# Patient Record
Sex: MALE | Race: WHITE | NOT HISPANIC OR LATINO | Employment: FULL TIME | URBAN - METROPOLITAN AREA
[De-identification: names, ages, dates, MRNs, and addresses within clinical notes are randomized per-mention and may not be internally consistent; named-entity substitution may affect disease eponyms.]

---

## 2024-02-26 ENCOUNTER — LAB (OUTPATIENT)
Dept: LAB | Facility: LAB | Age: 54
End: 2024-02-26
Payer: COMMERCIAL

## 2024-02-26 ENCOUNTER — OFFICE VISIT (OUTPATIENT)
Dept: NEUROLOGY | Facility: CLINIC | Age: 54
End: 2024-02-26
Payer: COMMERCIAL

## 2024-02-26 VITALS
RESPIRATION RATE: 16 BRPM | BODY MASS INDEX: 33.32 KG/M2 | HEIGHT: 65 IN | SYSTOLIC BLOOD PRESSURE: 115 MMHG | HEART RATE: 104 BPM | WEIGHT: 200 LBS | DIASTOLIC BLOOD PRESSURE: 83 MMHG

## 2024-02-26 DIAGNOSIS — G61.81 CIDP (CHRONIC INFLAMMATORY DEMYELINATING POLYNEUROPATHY) (MULTI): ICD-10-CM

## 2024-02-26 DIAGNOSIS — G61.81 CIDP (CHRONIC INFLAMMATORY DEMYELINATING POLYNEUROPATHY) (MULTI): Primary | ICD-10-CM

## 2024-02-26 PROCEDURE — 36415 COLL VENOUS BLD VENIPUNCTURE: CPT

## 2024-02-26 PROCEDURE — 1036F TOBACCO NON-USER: CPT | Performed by: STUDENT IN AN ORGANIZED HEALTH CARE EDUCATION/TRAINING PROGRAM

## 2024-02-26 PROCEDURE — 99215 OFFICE O/P EST HI 40 MIN: CPT | Mod: GC | Performed by: STUDENT IN AN ORGANIZED HEALTH CARE EDUCATION/TRAINING PROGRAM

## 2024-02-26 PROCEDURE — 99215 OFFICE O/P EST HI 40 MIN: CPT | Performed by: STUDENT IN AN ORGANIZED HEALTH CARE EDUCATION/TRAINING PROGRAM

## 2024-02-26 RX ORDER — CARBAMAZEPINE 100 MG/1
100 TABLET, CHEWABLE ORAL 2 TIMES DAILY
COMMUNITY

## 2024-02-26 RX ORDER — PREDNISONE 20 MG/1
60 TABLET ORAL DAILY
Qty: 180 TABLET | Refills: 2 | Status: SHIPPED | OUTPATIENT
Start: 2024-02-26 | End: 2024-08-24

## 2024-02-26 RX ORDER — HYDROCODONE BITARTRATE AND ACETAMINOPHEN 5; 325 MG/1; MG/1
1 TABLET ORAL EVERY 6 HOURS PRN
COMMUNITY
Start: 2024-02-19 | End: 2024-02-26

## 2024-02-26 RX ORDER — ERGOCALCIFEROL 1.25 MG/1
50000 CAPSULE ORAL
COMMUNITY
Start: 2024-02-15 | End: 2024-05-10

## 2024-02-26 ASSESSMENT — PAIN SCALES - GENERAL: PAINLEVEL: 1

## 2024-02-26 NOTE — PROGRESS NOTES
Date of Service: 2/26/2024  Patient: Dar Gómez  MRN: [id number]  Primary Care Physician: Enrico Oliva MD     History of Present Illness:    Mr. Gómez is a 53 y.o. 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 He come with his wife, and he is from [place].     To recap:   Around 2019, he started having bilateral upper and lower extremity tingling and numbness. Electrodiagnostic testing performed in the office on 08/2022 failed to reveal any obtainable motor or sensory responses (similar to what is reported in outside studies). These results would typically suggest an end-stage axonal peripheral polyneuropathy, however, that does not match his clinical presentation. For example, he has no notable atrophy or limitations in strength. 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 He was then started on IVIg.    Interval History:   - He was started on IVIg, and achieved good response, and came back 75% to his normal self. His best month was 03/2023.  - But around Thanksgiving of 2023, he started slow decline, mainly in his gait and distal hand function.  -- It became difficult for him to walk or open bottles.  - Then he was changed to SQIG, with continuous decline in his gait.  - Throughout this, he continues to have neuropathic pain, which is managed by Tegretol.   - He broke his right foot on 01/2024, and did not have much pain due to dense numbness in the feet.  - Currently: he has numbness and pain in the feet and up to the knees, and in the hands, along with weakness in the left foot and hands.    Allergies   Allergen Reactions    Iodine Swelling     Liquid with an ear surgery-topical      Medications:    Current Outpatient Medications:      "ergocalciferol (Vitamin D-2) 1.25 MG (27059 UT) capsule, Take 1 capsule (50,000 Units) by mouth every 7 days., Disp: , Rfl:     carBAMazepine (TEGretol) 100 mg chewable tablet, Chew 1 tablet (100 mg) 2 times a day., Disp: , Rfl:     HYDROcodone-acetaminophen (Norco) 5-325 mg tablet, Take 1 tablet by mouth every 6 hours if needed., Disp: , Rfl:     immune globulin, human, (Hizentra) subcutaneous infusion, Inject under the skin every 7 days., Disp: , Rfl:     predniSONE (Deltasone) 20 mg tablet, Take 3 tablets (60 mg) by mouth once daily., Disp: 180 tablet, Rfl: 2     Physical Exam:     /83   Pulse 104   Resp 16   Ht 1.651 m (5' 5\")   Wt 90.7 kg (200 lb)   BMI 33.28 kg/m²     Brief Neurological Exam:  MENTAL STATE:   Orientation was normal to time, place and person.      CRANIAL NERVES:   CN 3, 4, 6   Lids symmetric; no ptosis. EOMs normal alignment, full range. No nystagmus.   CN 5   Facial sensation intact bilaterally.   CN 7   Normal and symmetric facial strength. Nasolabial folds symmetric.   CN 8   Hearing intact to conversation  CN 9   Palate elevates symmetrically.   CN 11   Normal strength of shoulder shrug and neck turning.   CN 12   Tongue midline, with normal bulk and strength; no fasciculations.     Motor:  Muscle bulk: Normal throughout.  Muscle tone: Normal in both upper and lower extremities.  Movements: No fasciculations. Slight intention tremor    MMT reveals the following MRC grades:     R             L   5             5             Shoulder abduction    5             5             Shoulder adduction               5             5             Elbow flexion           5             5             Elbow extension      5             5             Wrist flexion             4+             4+             Wrist extension        4+             4+             Finger flexion          4-             4-             Finger extension      2             3             Finger abduction     4             4     "         Finger adduction    4             5             Thumb distal flexion                3             3             Thumb abduction       5             5             Hip flexion               5             5             Hip adduction           5             5             Hip abduction           5             5             Knee flexion            5             5             Knee extension        *             4             Ankle dorsiflexion    *             5             Ankle plantarflexion                *             5             Eversion   *             5             Inversion   *             3             Big toe extension     *             4             Toe flexion     * brace    REFLEXES:   RIGHT UE LEFT UE   BR:0   BR:0   Biceps:0 Biceps:0   Triceps:0 Triceps:0     RIGHT LLE LEFT LLE   Knee:0            Knee:0   Ankle:* Ankle:0   * brace     No clonus or other pathological reflexes.      Sensation:  Light touch & Pinprick: length dependent decrease sensation in the foot to the knees with the foot having dense numbness  Vibration: impaired in both big toes and malleoli, intact in knees  Proprioception: impaired impaired in both big toes, intact in ankle    Coordination: Slight ataxia on finger-nose-finger bilaterally     Gait: Right foot fracture with limited walking ability    Results:     The following labs, imaging, and results were personally reviewed and demonstrated:    Labs:    Lab Results   Component Value Date    SPEP NORMAL 08/04/2022     Impression/Plan:   Mr. Gómez is a 53 y.o. 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 Electrodiagnostic testing performed on 08/2022 failed to reveal any obtainable motor or sensory responses (similar to what is reported in outside studies). These results would typically suggest an end-stage axonal peripheral polyneuropathy, however, that does not match his clinical presentation. For example, he  has no notable atrophy or limitations in strength. A subsequent ultrasound revealed heterogeneously enlarged peripheral nerves at non-entrapment sites consistent with that of an acquired demyelinating neuropathy. He responded to IVIG for a few months, and then started declining, with current exam shows definitive worsening in his strength, mainly in the UE distal muscles and left foot, along with new global areflexia and new subtle tremor and ataxia.    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     Plan:  - Will send for Neurofascins antiboides.   Demyelinating Neuropathy Panel; [facility])   Neurofascins 155 (IgG & IgM), 140 (IgG), 186 (IgG & IgM); [facility])   CASPR-1 IgG (AKA Contactin 1 - CNTN1); [facility])   - Will start 60mg Prednisone to bridge Rituximab.  -- Start taking calcium 1500 mg and vitamin D 800 units. Monitor Blood pressure and glucose (refer to PCP).  - We recommend starting Rituximab, as it is the treatment of choice for AN and for refractory CIDP.   - For neuropathic pain, you can consider Duloxetine 30mg (and then go up to 60mg) instead of Tegretol.      Jonh Shah MD  Neuromuscular Fellow  Neurology, Neuromuscular Division  [facility]

## 2024-02-26 NOTE — PATIENT INSTRUCTIONS
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    Will send for antibodies. It takes up to a month for results.    Will start you on Prednisone 60 mg to calm your disease, and to bridge you to Rituximab.      When you take Prednisone, monitor your blood pressure and your sugar (talk with your primary care provider) and take calcium 1500 mg and vitamin D 800 units.    For neuropathic pain, you can consider Duloxetine 30mg instead of Tegretol.

## 2024-03-25 LAB — SCAN RESULT: NORMAL

## 2024-04-01 ENCOUNTER — TELEPHONE (OUTPATIENT)
Dept: NEUROLOGY | Facility: HOSPITAL | Age: 54
End: 2024-04-01
Payer: COMMERCIAL

## 2024-04-01 NOTE — TELEPHONE ENCOUNTER
Demyelinating Neuropathy Panel came back with positive non-IgG4  and non-IgG4 contactin 1     I communicated to the patient that some features of non-IgG4 contactin 1 fits wit his clinical picture, like distal weakness and not responding to IVIG. Rituximab tends to be effective with this type of paranodopathy.     The recommendations are the same as before:  - start 60mg Prednisone to bridge Rituximab.  -- Start taking calcium 1500 mg and vitamin D 800 units. Monitor Blood pressure and glucose (refer to PCP).  - We recommend starting Rituximab, as it is the treatment of choice for paranodopathy.     Jonh Shah MD    PGY-5, Neuromuscular Fellow    [facility]

## 2024-11-11 ENCOUNTER — OFFICE VISIT (OUTPATIENT)
Dept: NEUROLOGY | Facility: CLINIC | Age: 54
End: 2024-11-11
Payer: COMMERCIAL

## 2024-11-11 VITALS
WEIGHT: 205 LBS | BODY MASS INDEX: 34.16 KG/M2 | HEIGHT: 65 IN | HEART RATE: 84 BPM | DIASTOLIC BLOOD PRESSURE: 92 MMHG | RESPIRATION RATE: 16 BRPM | SYSTOLIC BLOOD PRESSURE: 131 MMHG

## 2024-11-11 DIAGNOSIS — G61.81 CIDP (CHRONIC INFLAMMATORY DEMYELINATING POLYNEUROPATHY) (MULTI): Primary | ICD-10-CM

## 2024-11-11 PROCEDURE — 99214 OFFICE O/P EST MOD 30 MIN: CPT | Performed by: STUDENT IN AN ORGANIZED HEALTH CARE EDUCATION/TRAINING PROGRAM

## 2024-11-11 PROCEDURE — 3008F BODY MASS INDEX DOCD: CPT | Performed by: STUDENT IN AN ORGANIZED HEALTH CARE EDUCATION/TRAINING PROGRAM

## 2024-11-11 PROCEDURE — 99214 OFFICE O/P EST MOD 30 MIN: CPT | Mod: GC | Performed by: STUDENT IN AN ORGANIZED HEALTH CARE EDUCATION/TRAINING PROGRAM

## 2024-11-11 RX ORDER — FUROSEMIDE 40 MG/1
40 TABLET ORAL DAILY PRN
COMMUNITY
Start: 2024-10-10

## 2024-11-11 RX ORDER — CALCIUM CARBONATE 300MG(750)
400 TABLET,CHEWABLE ORAL DAILY
COMMUNITY

## 2024-11-11 RX ORDER — LANOLIN ALCOHOL/MO/W.PET/CERES
1000 CREAM (GRAM) TOPICAL DAILY
COMMUNITY

## 2024-11-11 RX ORDER — VIT C/E/ZN/COPPR/LUTEIN/ZEAXAN 250MG-90MG
25 CAPSULE ORAL DAILY
COMMUNITY

## 2024-11-11 ASSESSMENT — PAIN SCALES - GENERAL: PAINLEVEL_OUTOF10: 3

## 2024-11-11 NOTE — PROGRESS NOTES
Neuromuscular Medicine Follow Up     Dar Gómez, MRN: [id number], : 1970  Reason for Visit: No chief complaint on file.     Primary Care Physician: Enrico Oliva MD     Impression/Plan:   Impression:  Dar Gómez is a 54 year old male who presents for follow-up of CIPD nodopathy with onset in  with initially multifocal sensory abnormalities and neuropathic pain. Has undergone workup with electrodiagnostic studies in 2022 which had absent SNAPs and CMAPs (though with minimal to no weakness or atrophy in the affected muscles). Subsequent neuromuscular ultrasound revealed heterogeneously enlarged peripheral nerves at non-entrapment sites consistent with an acquired demyelinating neuropathy. He was initially treated with IVIG with a positive response though after a few months of treatment he had a clinical decline with worsening strength, sensory disturbance, and global areflexia. Subsequent workup was notable for positive antibodies to neurofascin-155 and contactin-1.    Overall, patient's clinical presentation and course are consistent with a nodopathy. The initial treatment for this is steroids however this is not a long-term treatment (evidenced by patient's right foot fracture and subsequent poor healing) and patient did not have a robust response to this. He was then started on rituximab in May 2024 of which he has not reported any clinical benefit. On exam, there is slight worsening of his weakness though his reflexes are clearly present. Unclear of the significance of this. It is also unclear how effective the rituximab truly is (as we do not know what the natural history of this disease for Mr. Gómez would be if we did not use rituximab). We briefly discussed cyclophosphamide however this would be a last-resort. For now will continue rituximab and monitor response.    Plan:  -Continue rituximab; patient is due for next dose so will coordinate this  -Continue appropriate fall  prevention  -Briefly discussed idea of clinical trials with patient and wife    RTC in 4-5 months to monitor for clinical response    Dar Lloyd MD  Neuromuscular Fellow     Patient seen with Dr. Valentino      History of Present Illness:    Mr. Gómez is a 54 y.o. male who presents for follow-up of CIDP nodopathy (positive neruofascin-155 and contactin-1)    Interval history:  :Patient notes that things have been pretty much the same since last being seen. Unfortunately he did break his foot in January and then because of steroid use it didn't heal right and he ended up damaging the hardware (possibly during PT but he is not sure). He is now using a bone growth stimulator for healing.    He received rituximab in May 2024 which did not have any side effects that he noticed. Has not noticed any change in his symptoms. He notes that he has constant pain in his hands and feet, is more itchy, and has quite poor balance. He is unable to put on socks or jeans so he needs his wife to help with that. He wears sweat pants most of the time because of this. 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    Prior history:   Around 2019, he started having bilateral upper and lower extremity tingling and numbness. Electrodiagnostic testing performed in the office on 08/2022 failed to reveal any obtainable motor or sensory responses (similar to what is reported in outside studies). These results would typically suggest an end-stage axonal peripheral polyneuropathy, however, that does not match his clinical presentation. For example, he has no notable atrophy or limitations in strength. 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 He was then started on IVIg.  "    Relevant past medical, surgical, family, and social histories, along with ROS was reviewed and pertinent details noted above.     Allergies   Allergen Reactions    Iodine Swelling     Liquid with an ear surgery-topical      Medications:    Current Outpatient Medications:     cholecalciferol (Vitamin D3) 25 MCG (1000 UT) capsule, Take 1 capsule (25 mcg) by mouth once daily., Disp: , Rfl:     cyanocobalamin (Vitamin B-12) 1,000 mcg tablet, Take 1 tablet (1,000 mcg) by mouth once daily., Disp: , Rfl:     furosemide (Lasix) 40 mg tablet, Take 1 tablet (40 mg) by mouth once daily as needed., Disp: , Rfl:     magnesium oxide (Mag-Ox) 400 mg tablet, Take 1 tablet (400 mg) by mouth once daily., Disp: , Rfl:     vitamin C-multivitamin-mineral 500 mg tablet,chewable, Chew 1 tablet once daily., Disp: , Rfl:        Physical Exam:   BP (!) 131/92   Pulse 84   Resp 16   Ht 1.651 m (5' 5\")   Wt 93 kg (205 lb)   BMI 34.11 kg/m²      General Appearance:  No distress, alert, interactive and cooperative   Skin/extremities: Edema in right distal lower extremity  Neurological:  Mental status: the patient provided an accurate history, language was normal   Cranial Nerves:  CN 3, 4, 6   Pupils equal and round  Lids symmetric; no ptosis  EOMs fully intact  No nystagmus   CN 5   Facial sensation intact bilaterally   CN 7   Normal and symmetric facial strength; nasolabial folds symmetric   CN 8   Hearing intact to conversation    CN 9, 10   Palate elevates symmetrically   Phonation within normal limits, no dysarthria  CN 11   Shoulder shrug 5/5 bilaterally  CN 12   Tongue midline    Motor:   Muscle bulk: Decreased muscle bulk in bilateral intrinsic hand muscles and volar compartment of forearm  Movements: Mild kinetic tremor in bilateral upper extremities    Manual Muscle Testing (MMT) reveals the following MRC grades:  R L   Shoulder abduction  4 4  Elbow flexion   5- 5-  Elbow extension  5 5  Wrist flexion   4- 4-  Wrist " extension  4 4  Finger flexion at DIP (2,3) 4 4  Finger flexion at DIP (4,5) 4 4  Finger extension  4 4  Finger abduction  3 3  Finger adduction  3 2  Thumb abduction   4- 4  Hip flexion   4 4  Hip abduction    5 5  Hip adduction    5- 5-  Knee flexion   5 5  Knee extension  4+ 4+  Ankle dorsiflexion  4 4  Ankle plantarflexion  4 4  Big toe extension  3 3  Toe flexion   4- 4-    Wartenberg sign in left hand    Reflexes:     R          L  BR:               1          1  Biceps:         2          2  Triceps:        2          2  Knee:           2          2  Ankle:          0          0    Sensory:   Diminished pinprick sensation in bilateral lower extremities up to knees (did not test more proximally given patient was on jeans)  Diminished pinprick sensation RUE up to elbow, LUE up to shoulder    Coordination:    In both upper extremities, finger-nose-finger with slight dysmetria bilaterally    Gait:   Wide based, unsteady gait with feet externally rotated    Results:     [facility] demyelinating neuropathy panel from 2/26/24:  Positive for IgG against neurofascin-155 and contactin-1

## 2024-11-17 PROBLEM — G61.81 CIDP (CHRONIC INFLAMMATORY DEMYELINATING POLYNEUROPATHY) (MULTI): Status: ACTIVE | Noted: 2024-11-17

## 2024-11-25 ENCOUNTER — APPOINTMENT (OUTPATIENT)
Dept: NEUROLOGY | Facility: CLINIC | Age: 54
End: 2024-11-25
Payer: COMMERCIAL

## 2024-12-11 DIAGNOSIS — G61.81 CIDP (CHRONIC INFLAMMATORY DEMYELINATING POLYNEUROPATHY) (MULTI): ICD-10-CM

## 2024-12-16 ENCOUNTER — APPOINTMENT (OUTPATIENT)
Dept: NEUROLOGY | Facility: CLINIC | Age: 54
End: 2024-12-16
Payer: COMMERCIAL

## 2025-04-28 ENCOUNTER — OFFICE VISIT (OUTPATIENT)
Dept: NEUROLOGY | Facility: CLINIC | Age: 55
End: 2025-04-28
Payer: COMMERCIAL

## 2025-04-28 VITALS
DIASTOLIC BLOOD PRESSURE: 91 MMHG | WEIGHT: 211.2 LBS | BODY MASS INDEX: 35.19 KG/M2 | HEART RATE: 83 BPM | SYSTOLIC BLOOD PRESSURE: 137 MMHG | HEIGHT: 65 IN | RESPIRATION RATE: 16 BRPM

## 2025-04-28 DIAGNOSIS — G61.81 CIDP (CHRONIC INFLAMMATORY DEMYELINATING POLYNEUROPATHY) (MULTI): Primary | ICD-10-CM

## 2025-04-28 PROCEDURE — 3008F BODY MASS INDEX DOCD: CPT | Performed by: STUDENT IN AN ORGANIZED HEALTH CARE EDUCATION/TRAINING PROGRAM

## 2025-04-28 PROCEDURE — 99214 OFFICE O/P EST MOD 30 MIN: CPT | Mod: GC | Performed by: STUDENT IN AN ORGANIZED HEALTH CARE EDUCATION/TRAINING PROGRAM

## 2025-04-28 PROCEDURE — 99214 OFFICE O/P EST MOD 30 MIN: CPT | Performed by: STUDENT IN AN ORGANIZED HEALTH CARE EDUCATION/TRAINING PROGRAM

## 2025-04-28 RX ORDER — PSEUDOEPHEDRINE HCL 30 MG
2 TABLET ORAL DAILY
COMMUNITY

## 2025-04-28 RX ORDER — ASPIRIN 81 MG/1
81 TABLET ORAL
COMMUNITY
Start: 2025-04-08

## 2025-04-28 ASSESSMENT — PAIN SCALES - GENERAL: PAINLEVEL_OUTOF10: 0-NO PAIN

## 2025-04-28 NOTE — PATIENT INSTRUCTIONS
- Continue rituximab infusion every 6 months. If you need to adjust the schedule due to your work, that is OK. You can start the next dose in June.   - Get lab drawn today  - Follow up in the end of this year, around November.

## 2025-04-28 NOTE — PROGRESS NOTES
Neuromuscular Medicine Follow Up     Dar Gómez, MRN: [id number], : 1970  Reason for Visit: No chief complaint on file.     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 Has undergone workup with electrodiagnostic studies in 2022 which had absent SNAPs and CMAPs (though with minimal to no weakness or atrophy in the affected muscles). Subsequent neuromuscular ultrasound revealed heterogeneously enlarged peripheral nerves at non-entrapment sites consistent with an acquired demyelinating neuropathy. He was initially treated with IVIG with a positive response though after a few months of treatment he had a clinical decline with worsening strength, sensory disturbance, and global areflexia. Subsequent workup was notable for positive antibodies to neurofascin-155 and contactin-1.    Overall, patient's clinical presentation and course are consistent with a nodopathy. The initial treatment for this is steroids however this is not a long-term treatment (evidenced by patient's right foot fracture and subsequent poor healing) and patient did not have a robust response to this. He had some response to IVIG for a couple of months then declined. He was then started on rituximab in May 2024.    Today he reports he noticed some benefit from Rituximab. The shooting/lightening pain has gone most of the time. He thinks he is probably a little bit stronger. Balance is about the same, falls sometimes. No other new symptoms. Tolerates Rituximab well. On today's exam, he has some improvement of proximal limb strength in bilateral upper and lower extremities. Still has patchy sensory deficits in bilateral upper and lower extremities, with absent vibration sensation in distal  "limbs and impaired proprioception with sensory ataxia dysmetria.     We will continue Rituximab infusion since there is subjective and objective improvement. He will return to work in July this year. He wishes that he can get the next infusion in June, which we are OK with.  Given the physically demanding nature of his job, he is advised to comply with his fitness for duty evaluation for his job.     Plan:    -Continue rituximab; patient is due for next dose in July but due to his work, we will notify Dr Heck's office to give his next Rituximab dose in June.   -He had BMP done after last infusion but no CBC, so we will add CBC with diff today  -Continue appropriate fall prevention  -Follow up around November.        LUBNA Goel  Neuromuscular Fellow          History of Present Illness:    Mr. Gómez is a 55 y.o. 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 Last visit 11/11/2024. He is on Rituximab.     Interval history:  Since last visit, he thinks he is doing stable. Except for the fact that he had redo of his foot surgery because of broken hardware. His right foot is still casted.      Last Ritudximab infusion was in January this year. He had BMP and A1c checked in March this year after the infusion but no CBC. Symptoms are better than how it was 2 years ago, especially the sensory symptoms. Now he has less frequent neuropathic pain in extremities. He thinks he is a little bit stronger, though his hand dexterity is still terrible. Balance is about the same. He can walk for about half a mile without holding onto anything with good lighting before the foot surgery. Now he walks with a walker due to the foot surgery, can walk for about 20 feet. He is not confident in walking.     He had a couple of \"soft falls\" since last visit, eg, fell on couch, fell in the parking lot, etc. He did not tell his wife about his falls.    No recent infection.     He " tolerates rituximab well. Probably has some fatigue. He receives rituximab from [facility] from Dr Heck. He will go back to work in July. He works in a plant. Drives a fork lift. He has been driving with 2 feet for decades and he is used to it. He is confident that he can do his job well.     He has a walker, a wheelchair, and a shower chair at home. He does PT after the foot surgery.     Prior history:   Around 2019, he started having bilateral upper and lower extremity tingling and numbness. Electrodiagnostic testing performed in the office on 08/2022 failed to reveal any obtainable motor or sensory responses (similar to what is reported in outside studies). These results would typically suggest an end-stage axonal peripheral polyneuropathy, however, that does not match his clinical presentation. For example, he has no notable atrophy or limitations in strength. A subsequent ultrasound revealed heterogeneously enlarged peripheral nerves at non-entrapment sites consistent with that of an acquired demyelinating neuropathy.     He was then started on IVIg.  He responded to IVIG a few months, and then started declining. Given his distal weakness and ataxia, nodopathy was suspected, and the antibody panel showed positive neruofascin-155 and contactin-1. He was then started on Rituximab in May 2024 of which he has not reported any clinical benefit. On exam, there is slight worsening of his weakness though his reflexes are clearly present. Unclear of the significance of this. It is also unclear how effective the Rituximab truly is (as we do not know what the natural history of this disease for Mr. Gómez would be if we did not use Rituximab). We briefly discussed cyclophosphamide however this would be a last-resort. For now will continue Rituximab and monitor response.         Relevant past medical, surgical, family, and social histories, along with ROS was reviewed and pertinent details noted above.     Allergies  "  Allergen Reactions    Iodine Swelling     Liquid with an ear surgery-topical      Medications:    Current Outpatient Medications:     aspirin 81 mg EC tablet, Take 1 tablet (81 mg) by mouth once daily., Disp: , Rfl:     calcium citrate 250 mg calcium tablet, Take 2 tablets (500 mg) by mouth once daily., Disp: , Rfl:     cholecalciferol (Vitamin D3) 25 MCG (1000 UT) capsule, Take 1 capsule (25 mcg) by mouth once daily., Disp: , Rfl:     cyanocobalamin (Vitamin B-12) 1,000 mcg tablet, Take 1 tablet (1,000 mcg) by mouth once daily., Disp: , Rfl:     magnesium oxide (Mag-Ox) 400 mg tablet, Take 1 tablet (400 mg) by mouth once daily., Disp: , Rfl:     vitamin C-multivitamin-mineral 500 mg tablet,chewable, Chew 1 tablet once daily., Disp: , Rfl:     furosemide (Lasix) 40 mg tablet, Take 1 tablet (40 mg) by mouth once daily as needed. (Patient not taking: Reported on 4/28/2025), Disp: , Rfl:        Physical Exam:   BP (!) 137/91   Pulse 83   Resp 16   Ht 1.651 m (5' 5\")   Wt 95.8 kg (211 lb 3.2 oz)   BMI 35.15 kg/m²      General Appearance:  No distress, alert, interactive and cooperative   Skin/extremities: Edema in right distal lower extremity  Neurological:  Mental status: the patient provided an accurate history, language was normal   Cranial Nerves:  CN 3, 4, 6   Pupils equal and round  Lids symmetric; no ptosis  EOMs fully intact  No nystagmus   CN 5   Facial sensation intact bilaterally   CN 7   Normal and symmetric facial strength; nasolabial folds symmetric   CN 8   Hearing intact to conversation    CN 9, 10   Palate elevates symmetrically   Phonation within normal limits, no dysarthria  CN 11   Shoulder shrug 5/5 bilaterally  CN 12   Tongue midline    Motor:   Muscle bulk: Decreased muscle bulk in bilateral intrinsic hand muscles and volar compartment of forearm  Movements: Mild kinetic tremor in bilateral upper extremities    Manual Muscle Testing (MMT) reveals the following MRC grades:  R L   Shoulder " abduction  5 5  Elbow flexion   5 5  Elbow extension  5 5  Wrist flexion   5 5  Wrist extension  5 5  Finger flexion at DIP (2,3) 4+ 4+  Finger flexion at DIP (4,5) 4 4  Finger extension  4 4  Finger abduction  3 3  Finger adduction  3 2  Thumb abduction   4 4  Hip flexion   4 4  Hip abduction    5 5  Hip adduction    5  5   Knee flexion   5 5  Knee extension  5 5  Ankle dorsiflexion  NA 4  Ankle plantarflexion  NA 4+  Big toe extension  NA 3+  Toe flexion   NA 4+    Wartenberg sign in left hand    Reflexes:     R          L  BR:               2          1  Biceps:         2          2  Triceps:        2          2  Knee:           2          2  Ankle:          0          0    Sensory:    Light touch and temperature: decreased from elbows down to hands and from knees down to toes. With some patchy areas that has more sensation compared to other areas.   Pinprick: decreased from mid-arm down to hands, more decreased in the medial aspects of bilateral upper extremities. Decreased from slightly-above-knee level down to toes on both sides, with some areas that has more sensation compared to other areas but does not follow a specific dermatomal pattern.   Vibration: absent in toes and ankles bilaterally, intact in knees. Decreased in finger joints but intact in wrist joints bilaterally.   Proprioception: impaired position sensation in some of the fingers and toes on both sides. +ve pseudoathetosis in fingers bilaterally.     Coordination:    In both upper extremities, finger-nose-finger with mild ataxia and dysmetria bilaterally, worse with eyes closed. Pseudoathetosis is present on both sides as mentioned above    Gait:   Not tested today. On a wheel chair. Right foot casted    Results:     3/25/2025:   BMP wnl    [facility] demyelinating neuropathy panel from 2/26/24:  Positive for IgG against neurofascin-155 and contactin-1

## 2025-04-29 LAB
BASOPHILS # BLD AUTO: 20 CELLS/UL (ref 0–200)
BASOPHILS NFR BLD AUTO: 0.4 %
EOSINOPHIL # BLD AUTO: 150 CELLS/UL (ref 15–500)
EOSINOPHIL NFR BLD AUTO: 3 %
ERYTHROCYTE [DISTWIDTH] IN BLOOD BY AUTOMATED COUNT: 13.2 % (ref 11–15)
HCT VFR BLD AUTO: 48.2 % (ref 38.5–50)
HGB BLD-MCNC: 15.5 G/DL (ref 13.2–17.1)
LYMPHOCYTES # BLD AUTO: 885 CELLS/UL (ref 850–3900)
LYMPHOCYTES NFR BLD AUTO: 17.7 %
MCH RBC QN AUTO: 28.8 PG (ref 27–33)
MCHC RBC AUTO-ENTMCNC: 32.2 G/DL (ref 32–36)
MCV RBC AUTO: 89.6 FL (ref 80–100)
MONOCYTES # BLD AUTO: 855 CELLS/UL (ref 200–950)
MONOCYTES NFR BLD AUTO: 17.1 %
NEUTROPHILS # BLD AUTO: 3090 CELLS/UL (ref 1500–7800)
NEUTROPHILS NFR BLD AUTO: 61.8 %
PLATELET # BLD AUTO: 207 THOUSAND/UL (ref 140–400)
PMV BLD REES-ECKER: 11.8 FL (ref 7.5–12.5)
RBC # BLD AUTO: 5.38 MILLION/UL (ref 4.2–5.8)
WBC # BLD AUTO: 5 THOUSAND/UL (ref 3.8–10.8)